# Patient Record
Sex: FEMALE | Race: BLACK OR AFRICAN AMERICAN
[De-identification: names, ages, dates, MRNs, and addresses within clinical notes are randomized per-mention and may not be internally consistent; named-entity substitution may affect disease eponyms.]

---

## 2021-05-18 ENCOUNTER — HOSPITAL ENCOUNTER (EMERGENCY)
Dept: HOSPITAL 54 - ER | Age: 26
Discharge: HOME | End: 2021-05-18
Payer: COMMERCIAL

## 2021-05-18 VITALS — WEIGHT: 145 LBS | BODY MASS INDEX: 24.16 KG/M2 | HEIGHT: 65 IN

## 2021-05-18 VITALS — SYSTOLIC BLOOD PRESSURE: 137 MMHG | DIASTOLIC BLOOD PRESSURE: 75 MMHG

## 2021-05-18 DIAGNOSIS — F32.9: ICD-10-CM
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
## 2021-05-18 DIAGNOSIS — F29: Primary | ICD-10-CM

## 2021-05-18 DIAGNOSIS — F10.10: ICD-10-CM

## 2021-05-18 LAB
ALBUMIN SERPL BCP-MCNC: 4.1 G/DL (ref 3.4–5)
ALP SERPL-CCNC: 88 U/L (ref 46–116)
ALT SERPL W P-5'-P-CCNC: 22 U/L (ref 12–78)
APAP SERPL-MCNC: 0 UG/ML (ref 10–30)
AST SERPL W P-5'-P-CCNC: 34 U/L (ref 15–37)
BASOPHILS # BLD AUTO: 0 /CMM (ref 0–0.2)
BASOPHILS NFR BLD AUTO: 0.4 % (ref 0–2)
BILIRUB DIRECT SERPL-MCNC: 0.1 MG/DL (ref 0–0.2)
BILIRUB SERPL-MCNC: 0.4 MG/DL (ref 0.2–1)
BUN SERPL-MCNC: 9 MG/DL (ref 7–18)
CALCIUM SERPL-MCNC: 9.3 MG/DL (ref 8.5–10.1)
CHLORIDE SERPL-SCNC: 102 MMOL/L (ref 98–107)
CO2 SERPL-SCNC: 23 MMOL/L (ref 21–32)
COLOR UR: YELLOW
CREAT SERPL-MCNC: 1 MG/DL (ref 0.6–1.3)
EOSINOPHIL NFR BLD AUTO: 0 % (ref 0–6)
ETHANOL SERPL-MCNC: 18 MG/DL (ref 0–0)
GLUCOSE SERPL-MCNC: 115 MG/DL (ref 74–106)
HCT VFR BLD AUTO: 35 % (ref 33–45)
HGB BLD-MCNC: 11.4 G/DL (ref 11.5–14.8)
LYMPHOCYTES NFR BLD AUTO: 0.6 /CMM (ref 0.8–4.8)
LYMPHOCYTES NFR BLD AUTO: 7.6 % (ref 20–44)
MCHC RBC AUTO-ENTMCNC: 32 G/DL (ref 31–36)
MCV RBC AUTO: 89 FL (ref 82–100)
MONOCYTES NFR BLD AUTO: 0.6 /CMM (ref 0.1–1.3)
MONOCYTES NFR BLD AUTO: 8.7 % (ref 2–12)
NEUTROPHILS # BLD AUTO: 6.1 /CMM (ref 1.8–8.9)
NEUTROPHILS NFR BLD AUTO: 83.3 % (ref 43–81)
PH UR STRIP: 5 [PH] (ref 5–8)
PLATELET # BLD AUTO: 256 /CMM (ref 150–450)
POTASSIUM SERPL-SCNC: 3.7 MMOL/L (ref 3.5–5.1)
PROT SERPL-MCNC: 7.8 G/DL (ref 6.4–8.2)
RBC # BLD AUTO: 3.96 MIL/UL (ref 4–5.2)
RBC #/AREA URNS HPF: (no result) /HPF (ref 0–2)
SODIUM SERPL-SCNC: 138 MMOL/L (ref 136–145)
UROBILINOGEN UR STRIP-MCNC: 0.2 EU/DL
WBC #/AREA URNS HPF: (no result) /HPF (ref 0–3)
WBC NRBC COR # BLD AUTO: 7.3 K/UL (ref 4.3–11)

## 2021-05-18 PROCEDURE — G0480 DRUG TEST DEF 1-7 CLASSES: HCPCS

## 2021-05-18 NOTE — NUR
The patient alert and oriented x4. Patient discharged to home in stable 
condition. Written and verbal after care instructions given. Patient verbalizes 
understanding of instruction.

## 2021-05-18 NOTE — NUR
TO ER BED 10,URINE SPECIMEN COLLECTED, SUICIDE PRECAUTION, 1:1 SITTER AT 
BEDSIDE, AWAITING MD SMITH

## 2021-05-18 NOTE — NUR
ASSUME PT CARE, SELF PRESENTS TO ED C/O DEPRESSION. PT APPEARS ANXIOUS, CRYING. 
RLE ABRASIONS NOTED.PT STATES SHE USED "EYEBROW SHAPER TO HURT SELF." DENIES SI 
UPON INITIAL ASSESMENT. PT STATES UTD W/ TETANUS SHOT. AWAITING MD SMITH. 1:1 
SITTER AT BEDSIDE.

## 2021-05-18 NOTE — NUR
SS Consult:

SS Consult requested for Depression. The pt. is a 25-year old Black female who came to [facility] 
ED seeking medical attention for symptoms of Depression. SW met with pt. bedside. The pt. is 
alert & oriented x 4 and makes appropriate eye contact. Pt. appears well-groomed. Pt.s mood 
is depressed with depressed affect. Pt.s speech is WNL. The pt. stated that she is feeling 
depressed and cut herself on her leg to feel better. Pt. denies SI stating, I do not want 
to die. Patient stated that she said yes when the MD asked about SI because I didnt 
know what to say. SW explored pt.s mental health Hx. & coping mechanisms. Pt. stated she 
has never seen psychiatrist or been diagnosed with a mental illness. Pt. states she is not 
on any psychotropic medication. Pt. states she tends to cope by drinking alcohol every 2 
days (wine, whiskey, tequila and smoking weed). SW educated pt. on alternative positive 
coping mechanisms. SW explored pt.s psychosocial stressors. Pt. stated that her family: 
Mother & sister live in [place]. Per pt. she is sad to be so far away from her family but 
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 SW validated pt.s feeling of 
stress, loneliness, sadness. SW explored pt.s support system.  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 
music and in fact, lives with her  at [[address] [facility] [address]; [phone]]. Patient began crying during interview. SW offered pt. voluntary 
psych placement for Tx. Pt. refused stating she wants to go home. Pt. denies SI/HI and 
denies hallucinations. 



Safety Plan: SW provided pt. with mental Health resources listed below and pt. accepted 
them. SW encouraged pt. to seek medical attention at [facility] urgent Care [address] [place] [address] [phone] or any other facility listed if pt. at any time fears for 
her safety. Patient expressed understanding and verbally agreed to seek medical attention as 
instructed. Patient gave verbal consent to contact her Therapist, Dr. Freida Godinez 
[phone]. SW made multiple calls and left voicemail regarding situation. Per pt. she is 
scheduled to have a session with her therapist today. Per pt. she has left javier therapist 
messaged that she is currently in the hospital.  



Mental Health resources: 

PSYCHIATRIC OUTPATIENT SERVICES



[facility] Partial Hospitalization and Intensive Outpatient Program (Managed Care 
and [place] Only)[address] [phone]

[facility]

Partial Hospitalization and Outpatient [id number]  [address]

[place], [place] [phone]

[facility] Partial Hospitalization and Outpatient [id number] [place], [place] [phone]

[facility] Mental Health [place] [id number]

[place], [place] [phone]

[facility] Partial Hospitalization and Outpatient 
[id number] [place], .[phone]



Crisis and Hotline Telephone Numbers

24-Hour service unless stated 



[place] Crisis Hotlines:



[facility] Mental Health/Crisis Line........[phone]

Suicide Prevention Center (24 Hours).......[phone]

Suicide Prevention Crisis Center.......[phone] (24 Hours)

 Assaults Against Women Hotline.........357.858.8345 (24 Hours -- [facility])

[facility] Children Crisis Shelter...........[phone] (24 Hours)

Child Abuse Hotline............786.731.4054 [facility]

Rape Treatment Center (24 Hours)..........[phone]

Alcoholics Anonymous (24 Hours)..........[phone]

Cocaine Anonymous (24 Hours)............[phone]

Narcotics Anonymous (24 Hours)..........[phone]



[facility]



[address] [place] [address] [phone]



 Counseling:



[facility]

[address]

[place] [address] [phone]

(Specializes in in-depth psychotherapy for emotional distress: anxiety, depression, 
interpersonal conflicts, life transitions, childhood abuse)



[facility]

[address] [place] [address] [phone]

(Assist with solving problem marital difficulties, separation & divorce, aging parents, 
death & grief, chronic & terminal illness)



[facility]

[address] [place] [address] [phone]

(Deal with loss & grief, anxiety, marital difficulties) 



Homebound/Mental Health Services

[address]

[place] [address] [phone]

(Provide in-home mental services to people who are incapable of leaving their homes)



[facility]

[facility]

[address]

[place] [address] [phone]



[facility] 

[address]

[place] [address] [phone]





Mental Health Services

[facility]

 [address] [place] [address] [phone]

Services: Outpatient therapy for children, teens, young adults, adults, older adults, and 
families; Psychiatric services, medication support



Crisis and Hotline Telephone Numbers

24-Hour service unless stated 



[place] Crisis Hotlines:



[place] Mental Health/Crisis Line........251.653.7365

Suicide Prevention Center (24 Hours).......[phone]

Suicide Prevention Crisis Center.......[phone] (24 Hours)

Alcoholics Anonymous (24 Hours)..........[phone]

National Crisis Hotlines:

Alcohol and Drug Helpline - Provides referrals to local facilities where adolescents and 
adults can seek help. Brief intervention. 1-424.177.1631

AMANUEL Helpline [facility] [place] [facility] 2-492-148-AMANUEL

National Youth Crisis Hotline 1-912.691.1783

[facility] Provides free information on specific disorders, referral 
directory to mental health providers, national directory of local mental health associations 
1-186.234.1002 (M-F, 9-5 EST)



[facility] [place] [facility] Information Line: Provide sinformation and literature on 
mental illness by disorder-for professionals and general public. 1-462.667.4415